# Patient Record
Sex: FEMALE | Race: WHITE | NOT HISPANIC OR LATINO | URBAN - METROPOLITAN AREA
[De-identification: names, ages, dates, MRNs, and addresses within clinical notes are randomized per-mention and may not be internally consistent; named-entity substitution may affect disease eponyms.]

---

## 2024-04-16 ENCOUNTER — HOSPITAL ENCOUNTER (OUTPATIENT)
Dept: RADIOLOGY | Facility: EXTERNAL LOCATION | Age: 39
Discharge: HOME | End: 2024-04-16

## 2024-04-16 DIAGNOSIS — M79.671 RIGHT FOOT PAIN: ICD-10-CM

## 2024-11-27 ENCOUNTER — OFFICE VISIT (OUTPATIENT)
Dept: URGENT CARE | Age: 39
End: 2024-11-27
Payer: COMMERCIAL

## 2024-11-27 VITALS
SYSTOLIC BLOOD PRESSURE: 118 MMHG | HEIGHT: 62 IN | RESPIRATION RATE: 16 BRPM | OXYGEN SATURATION: 100 % | WEIGHT: 111.11 LBS | DIASTOLIC BLOOD PRESSURE: 71 MMHG | HEART RATE: 96 BPM | TEMPERATURE: 98.2 F | BODY MASS INDEX: 20.45 KG/M2

## 2024-11-27 DIAGNOSIS — N76.0 ACUTE VAGINITIS: Primary | ICD-10-CM

## 2024-11-27 LAB
CHLAMYDIA TRACHOMATIS: NOT DETECTED
ELECTRONIC CONTROL: NORMAL
INTERNAL PROCESS CONTROL: NORMAL
NEISSERIA GONORRHOEAE: NOT DETECTED
POC APPEARANCE, URINE: ABNORMAL
POC BACTERIAL VAGINITIS (RAPID): NEGATIVE
POC BILIRUBIN, URINE: NEGATIVE
POC BLOOD, URINE: ABNORMAL
POC COLOR, URINE: YELLOW
POC GLUCOSE, URINE: NEGATIVE MG/DL
POC KETONES, URINE: NEGATIVE MG/DL
POC LEUKOCYTES, URINE: ABNORMAL
POC NITRITE,URINE: NEGATIVE
POC PH, URINE: 6 PH
POC PROTEIN, URINE: NEGATIVE MG/DL
POC SPECIFIC GRAVITY, URINE: 1.02
POC UROBILINOGEN, URINE: 0.2 EU/DL
PREGNANCY TEST URINE, POC: NEGATIVE
TRICHOMONAS VAGINA: NOT DETECTED

## 2024-11-27 PROCEDURE — 87205 SMEAR GRAM STAIN: CPT

## 2024-11-27 PROCEDURE — 87086 URINE CULTURE/COLONY COUNT: CPT

## 2024-11-27 RX ORDER — FLUCONAZOLE 150 MG/1
150 TABLET ORAL SEE ADMIN INSTRUCTIONS
Qty: 2 TABLET | Refills: 0 | Status: SHIPPED | OUTPATIENT
Start: 2024-11-27 | End: 2024-11-28

## 2024-11-27 ASSESSMENT — PAIN SCALES - GENERAL: PAINLEVEL_OUTOF10: 0-NO PAIN

## 2024-11-27 NOTE — PROGRESS NOTES
Subjective   Patient ID: Desiree Naylor is a 39 y.o. female. They present today with a chief complaint of Vaginal Itching.    History of Present Illness  HPI  Patient presents with vaginal itching for the last month.  She states she notices itching during intercourse and with the insertion of the tampon.  She has had no other symptoms.  She denies vaginal discharge, vaginal bleeding, rash.  She has no UTI symptoms of concern including dysuria, urgency, frequency or hematuria.  The itching is localized to the vulva and vaginal walls.  She states she tries hydrocortisone with some relief.    Past Medical History  Allergies as of 11/27/2024    (No Known Allergies)       (Not in a hospital admission)             No past medical history on file.    No past surgical history on file.         Review of Systems  Review of Systems   Review of systems: A complete review of systems was done, and is as stated in the history of present illness, is otherwise negative or not pertinent to the complaint.       Objective    There were no vitals filed for this visit.  No LMP recorded.    Physical Exam  NAD. Well appearing    MMM   PERRLA   no icterus   TMs clear bilat   Oropharynx clear of exudate or tonsillar swelling/exudate   neck supple. ALICIA. No LAD   no resp distress. Lungs CTAB without w/r/r   RRR. No m/r/g   Abd; Soft. NTTP   ; self swabs pt preference   VALVERED x 4. MS 5/5   Skin; warm without rashes   pulses 2+ throughout   neuro intact with normal sensation and motor   psych a&o x 3       Procedures    Point of Care Test & Imaging Results from this visit      Assessment/Plan   Allergies, medications, history, and pertinent labs/EKGs/Imaging reviewed by Kahlil Mcgill PA-C.     Medical Decision Making  -ruled out vaginal infections causing pruritus  -trial diflucan pending yeast  -has fu with GYN next month  -fu sooner if new symptoms such as rash, discharge  -all ?s answered  -discussed with Dr. Gilliland    Orders and  Diagnoses  Diagnoses and all orders for this visit:  Acute vaginitis  -     POCT pregnancy, urine manually resulted  -     POCT UA Automated manually resulted  -     POCT STI PCR (GC,TRICH) manually resulted  -     Vaginitis Gram Stain For Bacterial Vaginosis + Yeast  -     POCT BV Blue Rapid - Bacterial Vaginitis manually resulted  -     Urine Culture

## 2024-11-29 LAB
BACTERIA UR CULT: NORMAL
CLUE CELLS VAG LPF-#/AREA: NORMAL /[LPF]
NUGENT SCORE: 0
YEAST VAG WET PREP-#/AREA: NORMAL

## 2025-02-02 ENCOUNTER — OFFICE VISIT (OUTPATIENT)
Dept: URGENT CARE | Age: 40
End: 2025-02-02
Payer: COMMERCIAL

## 2025-02-02 VITALS
DIASTOLIC BLOOD PRESSURE: 79 MMHG | RESPIRATION RATE: 16 BRPM | HEART RATE: 96 BPM | OXYGEN SATURATION: 98 % | TEMPERATURE: 98 F | SYSTOLIC BLOOD PRESSURE: 116 MMHG

## 2025-02-02 DIAGNOSIS — J01.10 ACUTE NON-RECURRENT FRONTAL SINUSITIS: Primary | ICD-10-CM

## 2025-02-02 PROCEDURE — 99213 OFFICE O/P EST LOW 20 MIN: CPT

## 2025-02-02 RX ORDER — DROSPIRENONE AND ETHINYL ESTRADIOL 0.03MG-3MG
1 KIT ORAL
COMMUNITY
Start: 2025-01-16

## 2025-02-02 RX ORDER — AMOXICILLIN AND CLAVULANATE POTASSIUM 875; 125 MG/1; MG/1
875 TABLET, FILM COATED ORAL 2 TIMES DAILY
Qty: 14 TABLET | Refills: 0 | Status: SHIPPED | OUTPATIENT
Start: 2025-02-02 | End: 2025-02-09

## 2025-02-02 NOTE — PROGRESS NOTES
Subjective   Patient ID: Desiree Naylor is a 39 y.o. female. They present today with a chief complaint of Sinus Problem and Earache (Pt presents with earache and sinus pain. ).          Past Medical History  Allergies as of 02/02/2025 - Reviewed 02/02/2025   Allergen Reaction Noted    Nitrofurantoin monohyd/m-cryst Other 01/16/2025       (Not in a hospital admission)       History reviewed. No pertinent past medical history.    History reviewed. No pertinent surgical history.         Review of Systems  Review of Systems                               Objective    Vitals:    02/02/25 1635   BP: 116/79   BP Location: Right arm   Patient Position: Sitting   Pulse: 96   Resp: 16   Temp: 36.7 °C (98 °F)   SpO2: 98%     Patient's last menstrual period was 01/29/2025 (exact date).    Physical Exam  Vitals reviewed.   HENT:      Head: Normocephalic and atraumatic.      Right Ear: Ear canal normal. No tenderness. A middle ear effusion is present.      Left Ear: Ear canal normal. No tenderness. A middle ear effusion is present.      Nose:      Right Sinus: Frontal sinus tenderness present. No maxillary sinus tenderness.      Left Sinus: Frontal sinus tenderness present. No maxillary sinus tenderness.      Mouth/Throat:      Lips: Pink.      Mouth: Mucous membranes are moist.      Pharynx: Oropharynx is clear. Uvula midline.   Eyes:      Extraocular Movements: Extraocular movements intact.      Conjunctiva/sclera: Conjunctivae normal.      Pupils: Pupils are equal, round, and reactive to light.   Cardiovascular:      Rate and Rhythm: Normal rate and regular rhythm.      Heart sounds: No murmur heard.  Pulmonary:      Effort: Pulmonary effort is normal.      Breath sounds: Normal breath sounds. No decreased breath sounds, wheezing, rhonchi or rales.   Skin:     General: Skin is warm.   Neurological:      Mental Status: She is alert and oriented to person, place, and time.   Psychiatric:         Mood and Affect: Mood normal.          Behavior: Behavior normal.             Point of Care Test & Imaging Results from this visit  No results found for this visit on 02/02/25.   No results found.    Diagnostic study results (if any) were reviewed by Chary Nielson PA-C.    Assessment/Plan   Allergies, medications, history, and pertinent labs/EKGs/Imaging reviewed by Chary Nielson PA-C.     Medical Decision Making  - will treat for sinusitis with augmentin.  - advised to start on Flonase nasal spray for ETD.  -         Patient is educated about their diagnoses.     -          Discussed medications benefits and adverse effects.     -          Answered all patient’s questions.     -          Patient will call 911 or go to the nearest ED if worsen symptoms .     -          Patient is agreeable to the plan of care and is deemed stable upon discharge.     -          Follow up with your primary care provider in two days.      Orders and Diagnoses  Diagnoses and all orders for this visit:  Acute non-recurrent frontal sinusitis  -     amoxicillin-pot clavulanate (Augmentin) 875-125 mg tablet; Take 1 tablet (875 mg) by mouth 2 times a day for 7 days.      Medical Admin Record      Patient disposition: Home    Electronically signed by Chary Nielson PA-C  6:08 PM